# Patient Record
Sex: FEMALE | URBAN - METROPOLITAN AREA
[De-identification: names, ages, dates, MRNs, and addresses within clinical notes are randomized per-mention and may not be internally consistent; named-entity substitution may affect disease eponyms.]

---

## 2017-01-19 ENCOUNTER — POST MORTEM DOCUMENTATION (OUTPATIENT)
Dept: TRANSPLANT | Facility: CLINIC | Age: 57
End: 2017-01-19

## 2017-01-19 NOTE — PROGRESS NOTES
Received notification of patient's death from patient's sister.  Place of death was reported as [facility].  Graft status at the time of death was reported as Functioning.  Additional information: Patient's follow up with [facility] transferred to [place]. Patient's sister wanted to update us on her status.  TIS verification is: Complete

## 2017-01-19 NOTE — Clinical Note
Patient's sister yany damir inform us that Teri had . Her follow up with [facility] had been transferred to [place], [place]

## 2017-04-19 ENCOUNTER — POST MORTEM DOCUMENTATION (OUTPATIENT)
Dept: TRANSPLANT | Facility: CLINIC | Age: 57
End: 2017-04-19